# Patient Record
Sex: FEMALE | Race: WHITE | Employment: OTHER | URBAN - METROPOLITAN AREA
[De-identification: names, ages, dates, MRNs, and addresses within clinical notes are randomized per-mention and may not be internally consistent; named-entity substitution may affect disease eponyms.]

---

## 2017-12-08 ENCOUNTER — HOSPITAL ENCOUNTER (OUTPATIENT)
Dept: MAMMOGRAPHY | Age: 62
Discharge: HOME | End: 2017-12-08
Attending: INTERNAL MEDICINE | Admitting: INTERNAL MEDICINE

## 2017-12-08 DIAGNOSIS — Z12.31 VISIT FOR SCREENING MAMMOGRAM: ICD-10-CM

## 2018-12-14 ENCOUNTER — HOSPITAL ENCOUNTER (OUTPATIENT)
Dept: MAMMOGRAPHY | Age: 63
Discharge: HOME | End: 2018-12-14
Payer: COMMERCIAL

## 2018-12-14 DIAGNOSIS — Z12.31 VISIT FOR SCREENING MAMMOGRAM: ICD-10-CM

## 2018-12-14 PROCEDURE — 77063 BREAST TOMOSYNTHESIS BI: CPT

## 2020-01-03 ENCOUNTER — HOSPITAL ENCOUNTER (OUTPATIENT)
Dept: MAMMOGRAPHY | Age: 65
Discharge: HOME | End: 2020-01-03
Payer: COMMERCIAL

## 2020-01-03 PROCEDURE — 77063 BREAST TOMOSYNTHESIS BI: CPT

## 2021-02-12 ENCOUNTER — HOSPITAL ENCOUNTER (OUTPATIENT)
Dept: MAMMOGRAPHY | Age: 66
Discharge: HOME | End: 2021-02-12
Payer: MEDICARE

## 2021-02-12 DIAGNOSIS — Z12.31 VISIT FOR SCREENING MAMMOGRAM: ICD-10-CM

## 2021-02-12 PROCEDURE — 77063 BREAST TOMOSYNTHESIS BI: CPT

## 2021-11-13 ENCOUNTER — HOSPITAL ENCOUNTER (EMERGENCY)
Age: 66
Discharge: HOME | End: 2021-11-13
Attending: EMERGENCY MEDICINE
Payer: MEDICARE

## 2021-11-13 ENCOUNTER — APPOINTMENT (OUTPATIENT)
Dept: GENERAL RADIOLOGY | Age: 66
End: 2021-11-13
Payer: MEDICARE

## 2021-11-13 ENCOUNTER — APPOINTMENT (OUTPATIENT)
Dept: CT IMAGING | Age: 66
End: 2021-11-13
Payer: MEDICARE

## 2021-11-13 VITALS
BODY MASS INDEX: 31.34 KG/M2 | HEART RATE: 99 BPM | DIASTOLIC BLOOD PRESSURE: 70 MMHG | HEIGHT: 66 IN | WEIGHT: 195 LBS | SYSTOLIC BLOOD PRESSURE: 124 MMHG | OXYGEN SATURATION: 98 % | RESPIRATION RATE: 18 BRPM | TEMPERATURE: 98.6 F

## 2021-11-13 DIAGNOSIS — L02.91 ABSCESS: ICD-10-CM

## 2021-11-13 DIAGNOSIS — R07.9 CHEST PAIN, UNSPECIFIED TYPE: Primary | ICD-10-CM

## 2021-11-13 LAB
A/G RATIO: 1.5 (ref 1.1–2.2)
ALBUMIN SERPL-MCNC: 4.5 G/DL (ref 3.4–5)
ALP BLD-CCNC: 62 U/L (ref 40–129)
ALT SERPL-CCNC: 22 U/L (ref 10–40)
ANION GAP SERPL CALCULATED.3IONS-SCNC: 11 MMOL/L (ref 3–16)
AST SERPL-CCNC: 17 U/L (ref 15–37)
BASOPHILS ABSOLUTE: 0 K/UL (ref 0–0.2)
BASOPHILS RELATIVE PERCENT: 0.5 %
BILIRUB SERPL-MCNC: <0.2 MG/DL (ref 0–1)
BILIRUBIN URINE: NEGATIVE
BLOOD, URINE: ABNORMAL
BUN BLDV-MCNC: 16 MG/DL (ref 7–20)
CALCIUM SERPL-MCNC: 9.5 MG/DL (ref 8.3–10.6)
CHLORIDE BLD-SCNC: 102 MMOL/L (ref 99–110)
CLARITY: CLEAR
CO2: 25 MMOL/L (ref 21–32)
COLOR: YELLOW
CREAT SERPL-MCNC: <0.5 MG/DL (ref 0.6–1.2)
D DIMER: 266 NG/ML DDU (ref 0–229)
EKG ATRIAL RATE: 102 BPM
EKG DIAGNOSIS: NORMAL
EKG P AXIS: 61 DEGREES
EKG P-R INTERVAL: 188 MS
EKG Q-T INTERVAL: 366 MS
EKG QRS DURATION: 88 MS
EKG QTC CALCULATION (BAZETT): 477 MS
EKG R AXIS: 111 DEGREES
EKG T AXIS: 43 DEGREES
EKG VENTRICULAR RATE: 102 BPM
EOSINOPHILS ABSOLUTE: 0.1 K/UL (ref 0–0.6)
EOSINOPHILS RELATIVE PERCENT: 1.2 %
EPITHELIAL CELLS, UA: NORMAL /HPF (ref 0–5)
GFR AFRICAN AMERICAN: >60
GFR NON-AFRICAN AMERICAN: >60
GLUCOSE BLD-MCNC: 114 MG/DL (ref 70–99)
GLUCOSE URINE: NEGATIVE MG/DL
HCT VFR BLD CALC: 43.4 % (ref 36–48)
HEMOGLOBIN: 14.6 G/DL (ref 12–16)
KETONES, URINE: NEGATIVE MG/DL
LACTIC ACID, SEPSIS: 0.9 MMOL/L (ref 0.4–1.9)
LACTIC ACID, SEPSIS: 1.4 MMOL/L (ref 0.4–1.9)
LEUKOCYTE ESTERASE, URINE: NEGATIVE
LIPASE: 32 U/L (ref 13–60)
LYMPHOCYTES ABSOLUTE: 1.9 K/UL (ref 1–5.1)
LYMPHOCYTES RELATIVE PERCENT: 19.4 %
MCH RBC QN AUTO: 28.7 PG (ref 26–34)
MCHC RBC AUTO-ENTMCNC: 33.5 G/DL (ref 31–36)
MCV RBC AUTO: 85.7 FL (ref 80–100)
MICROSCOPIC EXAMINATION: YES
MONOCYTES ABSOLUTE: 0.5 K/UL (ref 0–1.3)
MONOCYTES RELATIVE PERCENT: 5.7 %
NEUTROPHILS ABSOLUTE: 7 K/UL (ref 1.7–7.7)
NEUTROPHILS RELATIVE PERCENT: 73.2 %
NITRITE, URINE: NEGATIVE
PDW BLD-RTO: 13.4 % (ref 12.4–15.4)
PH UA: 6 (ref 5–8)
PLATELET # BLD: 360 K/UL (ref 135–450)
PMV BLD AUTO: 7.9 FL (ref 5–10.5)
POTASSIUM SERPL-SCNC: 4.1 MMOL/L (ref 3.5–5.1)
PRO-BNP: 43 PG/ML (ref 0–124)
PROTEIN UA: NEGATIVE MG/DL
RBC # BLD: 5.07 M/UL (ref 4–5.2)
RBC UA: NORMAL /HPF (ref 0–4)
SARS-COV-2, NAAT: NOT DETECTED
SODIUM BLD-SCNC: 138 MMOL/L (ref 136–145)
SPECIFIC GRAVITY UA: 1.02 (ref 1–1.03)
TOTAL PROTEIN: 7.6 G/DL (ref 6.4–8.2)
TROPONIN: <0.01 NG/ML
URINE REFLEX TO CULTURE: ABNORMAL
URINE TYPE: ABNORMAL
UROBILINOGEN, URINE: 0.2 E.U./DL
WBC # BLD: 9.5 K/UL (ref 4–11)
WBC UA: NORMAL /HPF (ref 0–5)

## 2021-11-13 PROCEDURE — 6360000004 HC RX CONTRAST MEDICATION: Performed by: EMERGENCY MEDICINE

## 2021-11-13 PROCEDURE — 85379 FIBRIN DEGRADATION QUANT: CPT

## 2021-11-13 PROCEDURE — 87635 SARS-COV-2 COVID-19 AMP PRB: CPT

## 2021-11-13 PROCEDURE — 10060 I&D ABSCESS SIMPLE/SINGLE: CPT

## 2021-11-13 PROCEDURE — 83880 ASSAY OF NATRIURETIC PEPTIDE: CPT

## 2021-11-13 PROCEDURE — 85025 COMPLETE CBC W/AUTO DIFF WBC: CPT

## 2021-11-13 PROCEDURE — 81001 URINALYSIS AUTO W/SCOPE: CPT

## 2021-11-13 PROCEDURE — 99283 EMERGENCY DEPT VISIT LOW MDM: CPT

## 2021-11-13 PROCEDURE — 80053 COMPREHEN METABOLIC PANEL: CPT

## 2021-11-13 PROCEDURE — 84484 ASSAY OF TROPONIN QUANT: CPT

## 2021-11-13 PROCEDURE — 93010 ELECTROCARDIOGRAM REPORT: CPT | Performed by: INTERNAL MEDICINE

## 2021-11-13 PROCEDURE — 83605 ASSAY OF LACTIC ACID: CPT

## 2021-11-13 PROCEDURE — 6370000000 HC RX 637 (ALT 250 FOR IP): Performed by: EMERGENCY MEDICINE

## 2021-11-13 PROCEDURE — 71260 CT THORAX DX C+: CPT

## 2021-11-13 PROCEDURE — 71045 X-RAY EXAM CHEST 1 VIEW: CPT

## 2021-11-13 PROCEDURE — 93005 ELECTROCARDIOGRAM TRACING: CPT | Performed by: EMERGENCY MEDICINE

## 2021-11-13 PROCEDURE — 83690 ASSAY OF LIPASE: CPT

## 2021-11-13 RX ORDER — SULFAMETHOXAZOLE AND TRIMETHOPRIM 800; 160 MG/1; MG/1
1 TABLET ORAL 2 TIMES DAILY
Qty: 6 TABLET | Refills: 0 | Status: SHIPPED | OUTPATIENT
Start: 2021-11-13 | End: 2021-11-16

## 2021-11-13 RX ORDER — ASPIRIN 81 MG/1
324 TABLET, CHEWABLE ORAL ONCE
Status: COMPLETED | OUTPATIENT
Start: 2021-11-13 | End: 2021-11-13

## 2021-11-13 RX ADMIN — IOPAMIDOL 75 ML: 755 INJECTION, SOLUTION INTRAVENOUS at 16:58

## 2021-11-13 RX ADMIN — ASPIRIN 324 MG: 81 TABLET, CHEWABLE ORAL at 15:59

## 2021-11-13 ASSESSMENT — HEART SCORE: ECG: 1

## 2021-11-13 NOTE — ED PROVIDER NOTES
[address] [facility]  ED      CHIEF COMPLAINT  Chest Pain (started yesterday. . dull in sternal area), Dizziness (lightheaded ), and Headache       HISTORY OF PRESENT ILLNESS  Erica Stewart is a 73 y.o. female who presents to the emergency department for evaluation of chest pain of 2 days. Patient reports she woke up from sleep with a substernal burning-like to dull pain yesterday morning. Reports since then, she has been having persistent, fluctuating, pain to the substernal area. Denies radiation of pain anywhere. Denies having associated shortness of breath. Reports having lightheadedness. Denies feeling like she is about to pass out. Denies having room spinning sensation. Describes it as, feeling like herself. Denies having history of CAD. Denies any prior stress test.  Denies having history of PE. Denies having any associated leg swelling or calf tenderness recently. Covid vaccinated x2. No known sick contacts. No fevers, chills, muscle aches, cough, or congestion. Reports still having her gallbladder. Drinks about 3-4 beers per day. Never had pancreatitis before. Reports also having a mild headache. Says she has tried some Tums at home. Denies trying any other medications. Says about a week ago, she noticed a cyst or an abscess on her skin to the upper abdomen. Says she popped it at home then sent a picture to her PCP and was prescribed doxycycline. Says she completed the antibiotic several days ago. No other complaints, modifying factors or associated symptoms. I have reviewed the following from the nursing documentation. History reviewed. No pertinent past medical history. Past Surgical History:   Procedure Laterality Date    ANKLE SURGERY      left.   SECTION       History reviewed. No pertinent family history.   Social History     Socioeconomic History    Marital status:      Spouse name: Not on file    Number of children: Not on file    Years of education: Not on file    Highest education level: Not on file   Occupational History    Not on file   Tobacco Use    Smoking status: Never Smoker    Smokeless tobacco: Never Used   Vaping Use    Vaping Use: Never used   Substance and Sexual Activity    Alcohol use: Yes     Alcohol/week: 3.0 standard drinks     Types: 3 Cans of beer per week     Comment: daily.  Drug use: No    Sexual activity: Not on file   Other Topics Concern    Not on file   Social History Narrative    Not on file     Social Determinants of Health     Financial Resource Strain:     Difficulty of Paying Living Expenses: Not on file   Food Insecurity:     Worried About Running Out of Food in the Last Year: Not on file    [place] of Food in the Last Year: Not on file   Transportation Needs:     Lack of Transportation (Medical): Not on file    Lack of Transportation (Non-Medical): Not on file   Physical Activity:     Days of Exercise per Week: Not on file    Minutes of Exercise per Session: Not on file   Stress:     Feeling of Stress : Not on file   Social Connections:     Frequency of Communication with Friends and Family: Not on file    Frequency of Social Gatherings with Friends and Family: Not on file    Attends Shinto Services: Not on file    Active Member of [address] [place] [address] [employer] or Organizations: Not on file    Attends Club or Organization Meetings: Not on file    Marital Status: Not on file   Intimate Partner Violence:     Fear of Current or Ex-Partner: Not on file    Emotionally Abused: Not on file    Physically Abused: Not on file    Sexually Abused: Not on file   Housing Stability:     Unable to Pay for Housing in the Last Year: Not on file    Number of [place] in the Last Year: Not on file    Unstable Housing in the Last Year: Not on file     No current facility-administered medications for this encounter.      Current Outpatient Medications   Medication Sig Dispense Refill    sulfamethoxazole-trimethoprim (BACTRIM DS;SEPTRA DS) 800-160 MG per tablet Take 1 tablet by mouth 2 times daily for 3 days 6 tablet 0    Cetirizine-Pseudoephedrine (ZYRTEC-D PO) Take by mouth       No Known Allergies    REVIEW OF SYSTEMS  10 systems reviewed, pertinent positives per HPI otherwise noted to be negative. PHYSICAL EXAM  /70   Pulse 99   Temp 98.6 °F (37 °C) (Oral)   Resp 18   Ht 5' 6\" (1.676 m)   Wt 195 lb (88.5 kg)   SpO2 98%   BMI 31.47 kg/m²    GENERAL APPEARANCE: Awake and alert. Pleasant. Resting comfortably on the chair. Reading a book. HENT: Normocephalic. Atraumatic. PERRL. EOMI. No facial droop. HEART/CHEST: RRR. Nondiaphoretic. LUNGS: Respirations unlabored. Speaking comfortably in full sentences. Clear to auscultation bilaterally. ABDOMEN: Soft, non-distended abdomen. Non tender to palpation. No guarding. No rebound. A small, fluctuant, 0.5 cm lesion on the epigastrium. No surrounding erythema. No active drainage. EXTREMITIES: No gross deformities. Moving all extremities. no lower extremity edema bilaterally  SKIN: Warm and dry. No acute rashes. NEUROLOGICAL: Alert and oriented. No gross facial drooping. Answering questions appropriately. Moving all extremities. PSYCHIATRIC: Pleasant. Normal mood and affect.     LABS  Results for orders placed or performed during the hospital encounter of 11/13/21   COVID-19, Rapid    Specimen: Nasopharyngeal Swab   Result Value Ref Range    SARS-CoV-2, NAAT Not Detected Not Detected   Comprehensive Metabolic Panel   Result Value Ref Range    Sodium 138 136 - 145 mmol/L    Potassium 4.1 3.5 - 5.1 mmol/L    Chloride 102 99 - 110 mmol/L    CO2 25 21 - 32 mmol/L    Anion Gap 11 3 - 16    Glucose 114 (H) 70 - 99 mg/dL    BUN 16 7 - 20 mg/dL    CREATININE <0.5 (L) 0.6 - 1.2 mg/dL    GFR Non-African American >60 >60    GFR African American >60 >60    Calcium 9.5 8.3 - 10.6 mg/dL    Total Protein 7.6 6.4 - 8.2 g/dL    Albumin 4.5 3.4 - 5.0 g/dL    Albumin/Globulin Result Value Ref Range    WBC, UA 0-2 0 - 5 /HPF    RBC, UA 3-4 0 - 4 /HPF    Epithelial Cells, UA 2-5 0 - 5 /HPF   EKG 12 Lead   Result Value Ref Range    Ventricular Rate 102 BPM    Atrial Rate 102 BPM    P-R Interval 188 ms    QRS Duration 88 ms    Q-T Interval 366 ms    QTc Calculation (Bazett) 477 ms    P Axis 61 degrees    R Axis 111 degrees    T Axis 43 degrees    Diagnosis       Sinus tachycardiaRight axis deviationAbnormal ECGConfirmed by Caio Griggs MD, Diann Anderson ([id number]) on 11/13/2021 3:57:06 PM       I have reviewed all labs for this visit. ECG  The Ekg interpreted by me shows  Sinus tachycardia with a rate of 102  Axis is right  QTc is prolonged at 477  Intervals and Durations are unremarkable. ST Segments: Nonspecific changes  No prior EKG available for comparison    RADIOLOGY  XR CHEST PORTABLE    Result Date: 11/13/2021  EXAMINATION: ONE XRAY VIEW OF THE CHEST 11/13/2021 3:41 pm COMPARISON: 08/05/2009 HISTORY: ORDERING SYSTEM PROVIDED HISTORY: chest pain TECHNOLOGIST PROVIDED HISTORY: Reason for exam:->chest pain Reason for Exam: Chest Pain (started yesterday. . dull in sternal area) FINDINGS: The lungs are without acute focal process. There is no effusion or pneumothorax. The cardiomediastinal silhouette is stable. The osseous structures are stable. No acute process. CT CHEST PULMONARY EMBOLISM W CONTRAST    Result Date: 11/13/2021  EXAMINATION: CTA OF THE CHEST 11/13/2021 4:49 pm TECHNIQUE: CTA of the chest was performed after the administration of intravenous contrast.  Multiplanar reformatted images are provided for review. MIP images are provided for review. Dose modulation, iterative reconstruction, and/or weight based adjustment of the mA/kV was utilized to reduce the radiation dose to as low as reasonably achievable. COMPARISON: None.  HISTORY: ORDERING SYSTEM PROVIDED HISTORY: cp. elevated d dimer TECHNOLOGIST PROVIDED HISTORY: Reason for exam:->cp. elevated d dimer Decision Support Exception - unselect if not a suspected or confirmed emergency medical condition->Emergency Medical Condition (MA) Reason for Exam: mid sternal chest pain and lightheadedness with a HA  that started yesterday Acuity: Acute Type of Exam: Initial Relevant Medical/Surgical History:  x3. FINDINGS: Pulmonary Arteries: Pulmonary arteries are adequately opacified for evaluation. No evidence of intraluminal filling defect to suggest pulmonary embolism. Main pulmonary artery is normal in caliber. Mediastinum: No evidence of mediastinal lymphadenopathy. The heart and pericardium demonstrate no acute abnormality. There is no acute abnormality of the thoracic aorta. Lungs/pleura: There is some tiny reticulonodular infiltrates along the right upper lobe laterally and extending inferiorly. There is a 5 mm nodule in the right middle lobe centrally. The bones are intact. There are mild linear densities along the lung bases anterior laterally. No effusion is seen. Upper Abdomen: Limited images of the upper abdomen are unremarkable. Soft Tissues/Bones: No acute bone or soft tissue abnormality. No evidence of a pulmonary embolus Mildly dilated atherosclerotic thoracic aorta with no aneurysm or dissection and no mediastinal adenopathy or mass Hazy reticulonodular infiltrate or fibrotic scarring along the right upper lobe. Recommend short-term follow-up. 5 mm nodule right middle lobe. Recommend follow-up as below. Fleischner Society guidelines for follow-up and management of incidentally detected pulmonary nodules: Single Solid Nodule: Nodule size less than 6 mm In a low-risk patient, no routine follow-up. In a high-risk patient, optional CT at 12 months. Nodule size equals 6-8 mm In a low-risk patient, CT at 6-12 months, then consider CT at 18-24 months. In a high-risk patient, CT at 6-12 months, then CT at 18-24 months.  Nodule size greater than 8 mm In a low-risk patient, consider CT at 3 months, PET/CT, or tissue sampling. In a high-risk patient, consider CT at 3 months, PET/CT, or tissue sampling - Low risk patients include individuals with minimal or absent history of smoking and other known risk factors. - High risk patients include individuals with a history or smoking or known risk factors. Radiology 2017 http://pubs. rsna.org/doi/full/10.1148/radiol. 4761730479     ED COURSE/MDM  Patient seen and evaluated. At presentation, patient was awake, alert, nondiaphoretic, afebrile, hemodynamically stable, and satting well on room air. Patient placed on cardiac monitor for close observation. Differential diagnosis includes ACS, arrhythmia, PE, aortic dissection, reflux, pancreatitis, among others. CBC CMP, troponin, D-dimer obtained. Stat EKG shows tachycardia with no acute ischemic changes. Troponin less than 0.01. As patient reports having 2 days of symptoms, patient does not require serial troponins at this time. Lipase within normal limits and therefore not due to pancreatitis. Liver enzymes within normal limits. BNP less than 100. D-dimer was elevated at 266. As she reports having chest pain and is tachycardic, will obtain CT PE to rule out pulmonary embolism. Heart score calculated and is 3. Discussed the heart score with the patient. I did offer her admission for further chest pain work-up and patient declined. Patient reports having a close family physician/ doctor and would prefer to follow-up with her PCP. Reports her chest pain is completely gone and says she will return to the emergency department if she develops any other symptoms. Says she has not been getting much sleep recently and thinks this may be related to her sleep. Patient instructed her to return to the emergency department immediately if her symptoms return. Patient verbalized understanding.  Patient was provided with referral for cardiology and an outpatient order for a stress test.  Patient discharged home with strict return precautions. Bedside ultrasound shows heterogenous fluid collection which I suspect is minimally remaining abscess on the epigastric region. I&D done at bedside. Patient given 3 day prescription of bactrim. Bedside Ultrasound Soft Tissue Exam:    Indication: fluctuant lesion  Heterogeneous fluid present 0.4cm in the greatest dimension (depth and width)   Impression: abscess    Images obtained and interpreted by myself, Wade Yancey. . PROCEDURE:  INCISION & DRAINAGE  Bill Conn or their surrogate had an opportunity to ask questions, and the risks, benefits, and alternatives were discussed. The abscess was prepped and draped to maintain a sterile field. A local anesthetic was used to completely anesthetize the abscess. A incision was made to keep the abscess open so it will continue to drain. There were no complications during the procedure. Pt was seen during the [place] 19 pandemic. Appropriate PPE worn by ME during patient encounters. Pt seen during a time with constrained hospital bed capacity and other potential inpatient and outpatient resources were constrained due to the viral pandemic. During the patient's ED course, the patient was given:  Medications   aspirin chewable tablet 324 mg (324 mg Oral Given 11/13/21 8259)   iopamidol (ISOVUE-370) 76 % injection 75 mL (75 mLs IntraVENous Given 11/13/21 7568)        CLINICAL IMPRESSION  1. Chest pain, unspecified type    2. Abscess        Blood pressure 124/70, pulse 99, temperature 98.6 °F (37 °C), temperature source Oral, resp. rate 18, height 5' 6\" (1.676 m), weight 195 lb (88.5 kg), SpO2 98 %. Jessica Castle was discharged home in stable condition. Patient was given scripts for the following medications. I counseled patient how to take these medications.    Discharge Medication List as of 11/13/2021  6:21 PM      START taking these medications    Details   sulfamethoxazole-trimethoprim (BACTRIM DS;SEPTRA DS) 800-160 MG per tablet Take 1 tablet by mouth 2 times daily for 3 days, Disp-6 tablet, R-0Print             Follow-up with:  [facility] [address] [facility]  [phone]  Call in 2 days      [address]  [phone]    In 2 days        DISCLAIMER: This chart was created using Dragon dictation software. Efforts were made by me to ensure accuracy, however some errors may be present due to limitations of this technology and occasionally words are not transcribed correctly.        Tasneem Gonzalez MD  11/14/21 1790

## 2022-02-15 ENCOUNTER — HOSPITAL ENCOUNTER (OUTPATIENT)
Dept: WOMENS IMAGING | Age: 67
Discharge: HOME | End: 2022-02-15
Payer: MEDICARE

## 2022-02-15 VITALS — BODY MASS INDEX: 32.14 KG/M2 | WEIGHT: 200 LBS | HEIGHT: 66 IN

## 2022-02-15 DIAGNOSIS — Z12.31 ENCOUNTER FOR SCREENING MAMMOGRAM FOR BREAST CANCER: ICD-10-CM

## 2022-02-15 PROCEDURE — 77063 BREAST TOMOSYNTHESIS BI: CPT

## 2023-02-16 ENCOUNTER — HOSPITAL ENCOUNTER (OUTPATIENT)
Dept: WOMENS IMAGING | Age: 68
Discharge: HOME | End: 2023-02-16
Payer: MEDICARE

## 2023-02-16 DIAGNOSIS — Z12.31 SCREENING MAMMOGRAM FOR BREAST CANCER: ICD-10-CM

## 2023-02-16 PROCEDURE — 77067 SCR MAMMO BI INCL CAD: CPT

## 2023-11-10 ENCOUNTER — APPOINTMENT (OUTPATIENT)
Dept: GENERAL RADIOLOGY | Age: 68
End: 2023-11-10
Payer: MEDICARE

## 2023-11-10 ENCOUNTER — HOSPITAL ENCOUNTER (EMERGENCY)
Age: 68
Discharge: HOME | End: 2023-11-11
Payer: MEDICARE

## 2023-11-10 VITALS
TEMPERATURE: 98.7 F | BODY MASS INDEX: 31.34 KG/M2 | RESPIRATION RATE: 18 BRPM | HEIGHT: 66 IN | DIASTOLIC BLOOD PRESSURE: 68 MMHG | WEIGHT: 195 LBS | HEART RATE: 111 BPM | OXYGEN SATURATION: 97 % | SYSTOLIC BLOOD PRESSURE: 162 MMHG

## 2023-11-10 DIAGNOSIS — J18.9 PNEUMONIA DUE TO INFECTIOUS ORGANISM, UNSPECIFIED LATERALITY, UNSPECIFIED PART OF LUNG: Primary | ICD-10-CM

## 2023-11-10 LAB
ALBUMIN SERPL-MCNC: 4.4 G/DL (ref 3.4–5)
ALBUMIN/GLOB SERPL: 1.3 {RATIO} (ref 1.1–2.2)
ALP SERPL-CCNC: 52 U/L (ref 40–129)
ALT SERPL-CCNC: 14 U/L (ref 10–40)
ANION GAP SERPL CALCULATED.3IONS-SCNC: 14 MMOL/L (ref 3–16)
AST SERPL-CCNC: 15 U/L (ref 15–37)
BASOPHILS # BLD: 0.1 K/UL (ref 0–0.2)
BASOPHILS NFR BLD: 1.1 %
BILIRUB SERPL-MCNC: <0.2 MG/DL (ref 0–1)
BUN SERPL-MCNC: 14 MG/DL (ref 7–20)
CALCIUM SERPL-MCNC: 9.3 MG/DL (ref 8.3–10.6)
CHLORIDE SERPL-SCNC: 106 MMOL/L (ref 99–110)
CO2 SERPL-SCNC: 23 MMOL/L (ref 21–32)
CREAT SERPL-MCNC: 0.6 MG/DL (ref 0.6–1.2)
DEPRECATED RDW RBC AUTO: 13.4 % (ref 12.4–15.4)
EOSINOPHIL # BLD: 0.2 K/UL (ref 0–0.6)
EOSINOPHIL NFR BLD: 2 %
GFR SERPLBLD CREATININE-BSD FMLA CKD-EPI: >60 ML/MIN/{1.73_M2}
GLUCOSE SERPL-MCNC: 141 MG/DL (ref 70–99)
HCT VFR BLD AUTO: 43.5 % (ref 36–48)
HGB BLD-MCNC: 14.4 G/DL (ref 12–16)
LYMPHOCYTES # BLD: 2.5 K/UL (ref 1–5.1)
LYMPHOCYTES NFR BLD: 27.9 %
MAGNESIUM SERPL-MCNC: 2.1 MG/DL (ref 1.8–2.4)
MCH RBC QN AUTO: 29.2 PG (ref 26–34)
MCHC RBC AUTO-ENTMCNC: 33.1 G/DL (ref 31–36)
MCV RBC AUTO: 88.4 FL (ref 80–100)
MONOCYTES # BLD: 0.8 K/UL (ref 0–1.3)
MONOCYTES NFR BLD: 8.2 %
NEUTROPHILS # BLD: 5.6 K/UL (ref 1.7–7.7)
NEUTROPHILS NFR BLD: 60.8 %
PLATELET # BLD AUTO: 351 K/UL (ref 135–450)
PMV BLD AUTO: 7.3 FL (ref 5–10.5)
POTASSIUM SERPL-SCNC: 3.3 MMOL/L (ref 3.5–5.1)
PROT SERPL-MCNC: 7.8 G/DL (ref 6.4–8.2)
RBC # BLD AUTO: 4.92 M/UL (ref 4–5.2)
SODIUM SERPL-SCNC: 143 MMOL/L (ref 136–145)
TROPONIN, HIGH SENSITIVITY: 7 NG/L (ref 0–14)
TROPONIN, HIGH SENSITIVITY: 8 NG/L (ref 0–14)
WBC # BLD AUTO: 9.1 K/UL (ref 4–11)

## 2023-11-10 PROCEDURE — 6370000000 HC RX 637 (ALT 250 FOR IP): Performed by: PHYSICIAN ASSISTANT

## 2023-11-10 PROCEDURE — 80053 COMPREHEN METABOLIC PANEL: CPT

## 2023-11-10 PROCEDURE — 83735 ASSAY OF MAGNESIUM: CPT

## 2023-11-10 PROCEDURE — 93005 ELECTROCARDIOGRAM TRACING: CPT | Performed by: EMERGENCY MEDICINE

## 2023-11-10 PROCEDURE — 71045 X-RAY EXAM CHEST 1 VIEW: CPT

## 2023-11-10 PROCEDURE — 99285 EMERGENCY DEPT VISIT HI MDM: CPT

## 2023-11-10 PROCEDURE — 84484 ASSAY OF TROPONIN QUANT: CPT

## 2023-11-10 PROCEDURE — 85025 COMPLETE CBC W/AUTO DIFF WBC: CPT

## 2023-11-10 RX ORDER — PANTOPRAZOLE SODIUM 40 MG/1
40 FOR SUSPENSION ORAL
COMMUNITY

## 2023-11-10 RX ORDER — ASPIRIN 325 MG
325 TABLET, DELAYED RELEASE (ENTERIC COATED) ORAL ONCE
Status: COMPLETED | OUTPATIENT
Start: 2023-11-10 | End: 2023-11-10

## 2023-11-10 RX ORDER — PREDNISONE 5 MG/1
5 TABLET ORAL DAILY
COMMUNITY

## 2023-11-10 RX ADMIN — ASPIRIN 325 MG: 325 TABLET, COATED ORAL at 22:39

## 2023-11-10 ASSESSMENT — PAIN DESCRIPTION - LOCATION: LOCATION: CHEST

## 2023-11-10 ASSESSMENT — PAIN SCALES - GENERAL: PAINLEVEL_OUTOF10: 1

## 2023-11-10 ASSESSMENT — PAIN - FUNCTIONAL ASSESSMENT: PAIN_FUNCTIONAL_ASSESSMENT: 0-10

## 2023-11-11 LAB
EKG ATRIAL RATE: 108 BPM
EKG DIAGNOSIS: NORMAL
EKG P AXIS: 50 DEGREES
EKG P-R INTERVAL: 182 MS
EKG Q-T INTERVAL: 348 MS
EKG QRS DURATION: 88 MS
EKG QTC CALCULATION (BAZETT): 466 MS
EKG R AXIS: 99 DEGREES
EKG T AXIS: 14 DEGREES
EKG VENTRICULAR RATE: 108 BPM

## 2023-11-11 PROCEDURE — 93010 ELECTROCARDIOGRAM REPORT: CPT | Performed by: INTERNAL MEDICINE

## 2023-11-11 PROCEDURE — 6370000000 HC RX 637 (ALT 250 FOR IP)

## 2023-11-11 RX ORDER — AMOXICILLIN AND CLAVULANATE POTASSIUM 875; 125 MG/1; MG/1
TABLET, FILM COATED ORAL
Status: COMPLETED
Start: 2023-11-11 | End: 2023-11-11

## 2023-11-11 RX ORDER — DOXYCYCLINE HYCLATE 100 MG
TABLET ORAL
Status: COMPLETED
Start: 2023-11-11 | End: 2023-11-11

## 2023-11-11 RX ADMIN — DOXYCYCLINE HYCLATE 100 MG: 100 TABLET, COATED ORAL at 03:00

## 2023-11-11 RX ADMIN — AMOXICILLIN AND CLAVULANATE POTASSIUM 1 TABLET: 875; 125 TABLET, FILM COATED ORAL at 02:30

## 2023-11-11 NOTE — ED NOTES
Patient provided with discharge instructions and any prescriptions. --Instructions, dosing, and follow-up appointments reviewed with patient/family. No further questions or needs at this time. --Vital signs and patient stable upon discharge. --IV d/c without complications. --Pt ambulated to private vehicle without difficulty.        Shelley Shay RN  11/11/23 3256

## 2023-11-12 NOTE — ED PROVIDER NOTES
I was not asked to see this patient. I was available for consultation if deemed necessary. I was only asked to interpret the EKG. Please see AILYN Myles's note for care of the patient while in the emergency department and for final disposition.     The Ekg interpreted by me shows  sinus tachycardia, horh=836    Axis is   Right axis deviation  QTc is   borderline prolonged QT     ST Segments: no acute change and nonspecific changes  No significant change from prior EKG dated - 11/13/21  No STEMI         Mimi Devries MD  11/11/23 7901
10.5 fL    Neutrophils % 60.8 %    Lymphocytes % 27.9 %    Monocytes % 8.2 %    Eosinophils % 2.0 %    Basophils % 1.1 %    Neutrophils Absolute 5.6 1.7 - 7.7 K/uL    Lymphocytes Absolute 2.5 1.0 - 5.1 K/uL    Monocytes Absolute 0.8 0.0 - 1.3 K/uL    Eosinophils Absolute 0.2 0.0 - 0.6 K/uL    Basophils Absolute 0.1 0.0 - 0.2 K/uL   Comprehensive Metabolic Panel w/ Reflex to MG   Result Value Ref Range    Sodium 143 136 - 145 mmol/L    Potassium reflex Magnesium 3.3 (L) 3.5 - 5.1 mmol/L    Chloride 106 99 - 110 mmol/L    CO2 23 21 - 32 mmol/L    Anion Gap 14 3 - 16    Glucose 141 (H) 70 - 99 mg/dL    BUN 14 7 - 20 mg/dL    Creatinine 0.6 0.6 - 1.2 mg/dL    Est, Glom Filt Rate >60 >60    Calcium 9.3 8.3 - 10.6 mg/dL    Total Protein 7.8 6.4 - 8.2 g/dL    Albumin 4.4 3.4 - 5.0 g/dL    Albumin/Globulin Ratio 1.3 1.1 - 2.2    Total Bilirubin <0.2 0.0 - 1.0 mg/dL    Alkaline Phosphatase 52 40 - 129 U/L    ALT 14 10 - 40 U/L    AST 15 15 - 37 U/L   Troponin   Result Value Ref Range    Troponin, High Sensitivity 8 0 - 14 ng/L   Troponin   Result Value Ref Range    Troponin, High Sensitivity 7 0 - 14 ng/L   Magnesium   Result Value Ref Range    Magnesium 2.10 1.80 - 2.40 mg/dL   EKG 12 Lead   Result Value Ref Range    Ventricular Rate 108 BPM    Atrial Rate 108 BPM    P-R Interval 182 ms    QRS Duration 88 ms    Q-T Interval 348 ms    QTc Calculation (Bazett) 466 ms    P Axis 50 degrees    R Axis 99 degrees    T Axis 14 degrees    Diagnosis       Sinus tachycardiaRightward axisBorderline Prolonged QTWhen compared with ECG of 13-NOV-2021 13:47,T wave inversion now evident in Inferior leadsConfirmed by Katiana Turcios ([id number]) on 11/11/2023 9:22:55 AM       I estimate there is LOW risk for PULMONARY EMBOLISM, ACUTE CORONARY SYNDROME, OR THORACIC AORTIC DISSECTION, thus I consider the discharge disposition reasonable.  Ratna Dodson and I have discussed the diagnosis and risks, and we agree with discharging home to follow-up

## 2024-02-23 ENCOUNTER — HOSPITAL ENCOUNTER (OUTPATIENT)
Dept: WOMENS IMAGING | Age: 69
Discharge: HOME | End: 2024-02-23
Payer: MEDICARE

## 2024-02-23 VITALS — BODY MASS INDEX: 30.53 KG/M2 | HEIGHT: 66 IN | WEIGHT: 190 LBS

## 2024-02-23 DIAGNOSIS — Z12.31 VISIT FOR SCREENING MAMMOGRAM: ICD-10-CM

## 2024-02-23 PROCEDURE — 77063 BREAST TOMOSYNTHESIS BI: CPT

## 2025-02-28 ENCOUNTER — HOSPITAL ENCOUNTER (OUTPATIENT)
Dept: WOMENS IMAGING | Age: 70
Discharge: HOME | End: 2025-02-28
Payer: MEDICARE

## 2025-02-28 VITALS — BODY MASS INDEX: 30.53 KG/M2 | HEIGHT: 66 IN | WEIGHT: 190 LBS

## 2025-02-28 DIAGNOSIS — Z12.31 VISIT FOR SCREENING MAMMOGRAM: ICD-10-CM

## 2025-02-28 PROCEDURE — 77063 BREAST TOMOSYNTHESIS BI: CPT
